# Patient Record
Sex: MALE | Race: ASIAN
[De-identification: names, ages, dates, MRNs, and addresses within clinical notes are randomized per-mention and may not be internally consistent; named-entity substitution may affect disease eponyms.]

---

## 2019-01-18 ENCOUNTER — HOSPITAL ENCOUNTER (EMERGENCY)
Dept: HOSPITAL 76 - ED | Age: 34
Discharge: HOME | End: 2019-01-18
Payer: COMMERCIAL

## 2019-01-18 VITALS — SYSTOLIC BLOOD PRESSURE: 146 MMHG | DIASTOLIC BLOOD PRESSURE: 98 MMHG

## 2019-01-18 DIAGNOSIS — E11.65: Primary | ICD-10-CM

## 2019-01-18 DIAGNOSIS — Z79.4: ICD-10-CM

## 2019-01-18 DIAGNOSIS — Z91.128: ICD-10-CM

## 2019-01-18 DIAGNOSIS — T38.3X6A: ICD-10-CM

## 2019-01-18 LAB
ALBUMIN DIAFP-MCNC: 4.5 G/DL (ref 3.2–5.5)
ALBUMIN/GLOB SERPL: 1.2 {RATIO} (ref 1–2.2)
ALP SERPL-CCNC: 110 IU/L (ref 42–121)
ALT SERPL W P-5'-P-CCNC: 19 IU/L (ref 10–60)
ANION GAP SERPL CALCULATED.4IONS-SCNC: 11 MMOL/L (ref 6–13)
AST SERPL W P-5'-P-CCNC: 18 IU/L (ref 10–42)
BASE EXCESS BLDV CALC-SCNC: 0.9 MMOL/L
BASOPHILS NFR BLD AUTO: 0.1 10^3/UL (ref 0–0.1)
BASOPHILS NFR BLD AUTO: 0.9 %
BILIRUB BLD-MCNC: 0.9 MG/DL (ref 0.2–1)
BUN SERPL-MCNC: 17 MG/DL (ref 6–20)
CALCIUM UR-MCNC: 9.6 MG/DL (ref 8.5–10.3)
CHLORIDE SERPL-SCNC: 96 MMOL/L (ref 101–111)
CLARITY UR REFRACT.AUTO: CLEAR
CO2 BLDV-SCNC: 27 MMOL/L (ref 24–29)
CO2 SERPL-SCNC: 26 MMOL/L (ref 21–32)
CREAT SERPLBLD-SCNC: 0.8 MG/DL (ref 0.6–1.2)
EOSINOPHIL # BLD AUTO: 0.1 10^3/UL (ref 0–0.7)
EOSINOPHIL NFR BLD AUTO: 1.1 %
ERYTHROCYTE [DISTWIDTH] IN BLOOD BY AUTOMATED COUNT: 12.6 % (ref 12–15)
GFRSERPLBLD MDRD-ARVRAT: 111 ML/MIN/{1.73_M2} (ref 89–?)
GLOBULIN SER-MCNC: 3.9 G/DL (ref 2.1–4.2)
GLUCOSE SERPL-MCNC: 382 MG/DL (ref 70–100)
GLUCOSE UR QL STRIP.AUTO: >=1000 MG/DL
HGB UR QL STRIP: 16.1 G/DL (ref 14–18)
KETONES SERPL STRIP-SCNC: NEGATIVE MMOL/L
KETONES UR QL STRIP.AUTO: NEGATIVE MG/DL
LIPASE SERPL-CCNC: 67 U/L (ref 22–51)
LYMPHOCYTES # SPEC AUTO: 1.7 10^3/UL (ref 1.5–3.5)
LYMPHOCYTES NFR BLD AUTO: 20.1 %
MCH RBC QN AUTO: 28.9 PG (ref 27–31)
MCHC RBC AUTO-ENTMCNC: 34 G/DL (ref 32–36)
MCV RBC AUTO: 85.1 FL (ref 80–94)
MONOCYTES # BLD AUTO: 0.8 10^3/UL (ref 0–1)
MONOCYTES NFR BLD AUTO: 9.1 %
NEUTROPHILS # BLD AUTO: 5.6 10^3/UL (ref 1.5–6.6)
NEUTROPHILS # SNV AUTO: 8.2 X10^3/UL (ref 4.8–10.8)
NEUTROPHILS NFR BLD AUTO: 68.8 %
NITRITE UR QL STRIP.AUTO: NEGATIVE
PCO2 BLDV: 41.9 MMHG (ref 41–51)
PDW BLD AUTO: 8.7 FL (ref 7.4–11.4)
PH BLDV: 7.41 [PH] (ref 7.31–7.41)
PH UR STRIP.AUTO: 6 PH (ref 5–7.5)
PLATELET # BLD: 291 10^3/UL (ref 130–450)
PO2 BLDV: 72.6 MMHG (ref 25–47)
PROT SPEC-MCNC: 8.4 G/DL (ref 6.7–8.2)
PROT UR STRIP.AUTO-MCNC: NEGATIVE MG/DL
RBC # UR STRIP.AUTO: (no result) /UL
RBC MAR: 5.55 10^6/UL (ref 4.7–6.1)
SODIUM SERPLBLD-SCNC: 133 MMOL/L (ref 135–145)
SP GR UR STRIP.AUTO: 1.01 (ref 1–1.03)
UROBILINOGEN UR QL STRIP.AUTO: (no result) E.U./DL
UROBILINOGEN UR STRIP.AUTO-MCNC: NEGATIVE MG/DL

## 2019-01-18 PROCEDURE — 87086 URINE CULTURE/COLONY COUNT: CPT

## 2019-01-18 PROCEDURE — 81003 URINALYSIS AUTO W/O SCOPE: CPT

## 2019-01-18 PROCEDURE — 82009 KETONE BODYS QUAL: CPT

## 2019-01-18 PROCEDURE — 85025 COMPLETE CBC W/AUTO DIFF WBC: CPT

## 2019-01-18 PROCEDURE — 99283 EMERGENCY DEPT VISIT LOW MDM: CPT

## 2019-01-18 PROCEDURE — 81001 URINALYSIS AUTO W/SCOPE: CPT

## 2019-01-18 PROCEDURE — 82803 BLOOD GASES ANY COMBINATION: CPT

## 2019-01-18 PROCEDURE — 80053 COMPREHEN METABOLIC PANEL: CPT

## 2019-01-18 PROCEDURE — 96361 HYDRATE IV INFUSION ADD-ON: CPT

## 2019-01-18 PROCEDURE — 83690 ASSAY OF LIPASE: CPT

## 2019-01-18 PROCEDURE — 36415 COLL VENOUS BLD VENIPUNCTURE: CPT

## 2019-01-18 PROCEDURE — 96360 HYDRATION IV INFUSION INIT: CPT

## 2019-01-18 NOTE — ED PHYSICIAN DOCUMENTATION
History of Present Illness





- Stated complaint


Stated Complaint: HIGH BLOOD SUGAR





- Chief complaint


Chief Complaint: General





- Additonal information


Additional information: 


33-year-old male was sent in from the [facility] for evaluation of elevated 

blood sugar.  The patient is a known diabetic and has not been taking his 

medications for the past several weeks.  The patient has no complaints or 

symptoms he just had an elevated blood sugar and was sent in to the emergency 

department for evaluation.  No other associated symptoms.  No triggering 

factors.  No relieving factors








Review of Systems


Constitutional: denies: Fever, Chills


Eyes: denies: Discharge


Ears: denies: Ear pain


Nose: denies: Congestion


Throat: denies: Sore throat


Cardiac: denies: Chest pain / pressure


Respiratory: denies: Cough


GI: denies: Abdominal Pain


: denies: Dysuria


Skin: denies: Rash


Musculoskeletal: denies: Neck pain


Neurologic: denies: Generalized weakness


Immunocompromised: denies: Chemotherapy





PD PAST MEDICAL HISTORY





- Past Medical History


Past Medical History: Yes


Endocrine/Autoimmune: Type 2 diabetes





- Past Surgical History


Past Surgical History: No





- Present Medications


Home Medications: 


                                Ambulatory Orders











 Medication  Instructions  Recorded  Confirmed


 


Insulin Glargine [Lantus Solostar] 20 unit SQ DAILY #1 pen 01/18/19 














- Allergies


Allergies/Adverse Reactions: 


                                    Allergies











Allergy/AdvReac Type Severity Reaction Status Date / Time


 


No Known Drug Allergies Allergy   Verified 01/18/19 12:46














- Social History


Does the pt smoke?: No


Smoking Status: Never smoker


Does the pt drink ETOH?: Yes


Does the pt have substance abuse?: No





- Immunizations


Immunizations are current?: Yes





- POLST


Patient has POLST: No





PD ED PE NORMAL





- General


General: Alert and oriented X 3, No acute distress





- HEENT


HEENT: Atraumatic, PERRL, EOMI, Ears normal





- Cardiac


Cardiac: RRR, Strong equal pulses





- Respiratory


Respiratory: No respiratory distress, Clear bilaterally





- Abdomen


Abdomen: Soft, Non tender, Non distended





- Derm


Derm: Normal color





- Extremities


Extremities: No deformity, Normal ROM s pain, No edema





- Neuro


Neuro: Alert and oriented X 3, Normal speech





- Psych


Psych: Normal mood





Results





- Vitals


Vitals: 


                               Vital Signs - 24 hr











  01/18/19





  12:43


 


Temperature 36.3 C L


 


Heart Rate 85


 


Respiratory 16





Rate 


 


Blood Pressure 146/98 H


 


O2 Saturation 97








                                     Oxygen











O2 Source                      Room air

















- Labs


Labs: 


                                Laboratory Tests











  01/18/19 01/18/19 01/18/19





  13:02 13:25 13:25


 


WBC   8.2 


 


RBC   5.55 


 


Hgb   16.1 


 


Hct   47.2 


 


MCV   85.1 


 


MCH   28.9 


 


MCHC   34.0 


 


RDW   12.6 


 


Plt Count   291 


 


MPV   8.7 


 


Neut # (Auto)   5.6 


 


Lymph # (Auto)   1.7 


 


Mono # (Auto)   0.8 


 


Eos # (Auto)   0.1 


 


Baso # (Auto)   0.1 


 


Absolute Nucleated RBC   0.00 


 


Nucleated RBC %   0.0 


 


VBG pH   


 


VBG pCO2   


 


VBG pO2   


 


VBG HCO3   


 


VBG Total CO2   


 


VBG O2 Saturation   


 


VBG Base Excess   


 


Sodium    133 L


 


Potassium    4.0


 


Chloride    96 L


 


Carbon Dioxide    26


 


Anion Gap    11.0


 


BUN    17


 


Creatinine    0.8


 


Estimated GFR (MDRD)    111


 


Glucose    382 H


 


POC Whole Bld Glucose  344 H  


 


Calcium    9.6


 


Total Bilirubin    0.9


 


AST    18


 


ALT    19


 


Alkaline Phosphatase    110


 


Total Protein    8.4 H


 


Albumin    4.5


 


Globulin    3.9


 


Albumin/Globulin Ratio    1.2


 


Lipase    67 H


 


Urine Color   


 


Urine Clarity   


 


Urine pH   


 


Ur Specific Gravity   


 


Urine Protein   


 


Urine Glucose (UA)   


 


Urine Ketones   


 


Urine Occult Blood   


 


Urine Nitrite   


 


Urine Bilirubin   


 


Urine Urobilinogen   


 


Ur Leukocyte Esterase   


 


Ur Microscopic Review   


 


Urine Culture Comments   


 


Serum Ketones    NEGATIVE














  01/18/19 01/18/19 01/18/19





  13:25 14:08 14:34


 


WBC   


 


RBC   


 


Hgb   


 


Hct   


 


MCV   


 


MCH   


 


MCHC   


 


RDW   


 


Plt Count   


 


MPV   


 


Neut # (Auto)   


 


Lymph # (Auto)   


 


Mono # (Auto)   


 


Eos # (Auto)   


 


Baso # (Auto)   


 


Absolute Nucleated RBC   


 


Nucleated RBC %   


 


VBG pH  7.406  


 


VBG pCO2  41.9  


 


VBG pO2  72.6 H  


 


VBG HCO3  25.7  


 


VBG Total CO2  27.0  


 


VBG O2 Saturation  95.5 H  


 


VBG Base Excess  0.9  


 


Sodium   


 


Potassium   


 


Chloride   


 


Carbon Dioxide   


 


Anion Gap   


 


BUN   


 


Creatinine   


 


Estimated GFR (MDRD)   


 


Glucose   


 


POC Whole Bld Glucose    293 H


 


Calcium   


 


Total Bilirubin   


 


AST   


 


ALT   


 


Alkaline Phosphatase   


 


Total Protein   


 


Albumin   


 


Globulin   


 


Albumin/Globulin Ratio   


 


Lipase   


 


Urine Color   YELLOW 


 


Urine Clarity   CLEAR 


 


Urine pH   6.0 


 


Ur Specific Gravity   1.015 


 


Urine Protein   NEGATIVE 


 


Urine Glucose (UA)   >=1000 H 


 


Urine Ketones   NEGATIVE 


 


Urine Occult Blood   TRACE-LYSE 


 


Urine Nitrite   NEGATIVE 


 


Urine Bilirubin   NEGATIVE 


 


Urine Urobilinogen   0.2 (NORMAL) 


 


Ur Leukocyte Esterase   NEGATIVE 


 


Ur Microscopic Review   NOT INDICATED 


 


Urine Culture Comments   NOT INDICATED 


 


Serum Ketones   














PD MEDICAL DECISION MAKING





- ED course


ED course: 


On reevaluation patient is resting comfortably and his symptoms appear to be 

under control.  The patient's workup does not reveal any evidence of DKA or An 

acute complication of hypoglycemia from his diabetes.  Presently the patient 

appears appropriate for discharge and ongoing outpatient management.  I will 

write a prescription for his normal Lantus pen.  The patient reports taking 20 

units daily.  I advised follow-up with primary care for ongoing refills.  The 

patient will return for any worsening or any concerns.








Departure





- Departure


Disposition: 01 Home, Self Care


Clinical Impression: 


 Hyperglycemia





Condition: Good


Instructions:  Hyperglycemia


Follow-Up: 


[facility] [Provider Group] - Within 1 week


Prescriptions: 


Insulin Glargine [Lantus Solostar] 20 unit SQ DAILY #1 pen


Comments: 


These follow-up with your primary for further refills of your Lantus





Please return for any worsening or any concerns